# Patient Record
Sex: MALE | Race: WHITE | Employment: OTHER | URBAN - NONMETROPOLITAN AREA
[De-identification: names, ages, dates, MRNs, and addresses within clinical notes are randomized per-mention and may not be internally consistent; named-entity substitution may affect disease eponyms.]

---

## 2017-08-31 ENCOUNTER — APPOINTMENT (OUTPATIENT)
Dept: CT IMAGING | Age: 47
End: 2017-08-31
Payer: COMMERCIAL

## 2017-08-31 ENCOUNTER — HOSPITAL ENCOUNTER (EMERGENCY)
Age: 47
Discharge: HOME | End: 2017-08-31
Attending: FAMILY MEDICINE
Payer: COMMERCIAL

## 2017-08-31 VITALS
HEIGHT: 71 IN | WEIGHT: 185 LBS | HEART RATE: 52 BPM | OXYGEN SATURATION: 95 % | DIASTOLIC BLOOD PRESSURE: 100 MMHG | BODY MASS INDEX: 25.9 KG/M2 | RESPIRATION RATE: 22 BRPM | SYSTOLIC BLOOD PRESSURE: 140 MMHG

## 2017-08-31 DIAGNOSIS — S01.01XA SCALP LACERATION, INITIAL ENCOUNTER: Primary | ICD-10-CM

## 2017-08-31 PROCEDURE — 2500000003 HC RX 250 WO HCPCS: Performed by: FAMILY MEDICINE

## 2017-08-31 PROCEDURE — 12002 RPR S/N/AX/GEN/TRNK2.6-7.5CM: CPT

## 2017-08-31 PROCEDURE — 72125 CT NECK SPINE W/O DYE: CPT

## 2017-08-31 PROCEDURE — 70450 CT HEAD/BRAIN W/O DYE: CPT

## 2017-08-31 PROCEDURE — 99283 EMERGENCY DEPT VISIT LOW MDM: CPT

## 2017-08-31 RX ORDER — LIDOCAINE HYDROCHLORIDE AND EPINEPHRINE 10; 10 MG/ML; UG/ML
20 INJECTION, SOLUTION INFILTRATION; PERINEURAL ONCE
Status: COMPLETED | OUTPATIENT
Start: 2017-08-31 | End: 2017-08-31

## 2017-08-31 RX ADMIN — LIDOCAINE HYDROCHLORIDE,EPINEPHRINE BITARTRATE 20 ML: 10; .01 INJECTION, SOLUTION INFILTRATION; PERINEURAL at 22:30

## 2017-08-31 ASSESSMENT — ENCOUNTER SYMPTOMS
BACK PAIN: 0
RHINORRHEA: 0
VOMITING: 0
SORE THROAT: 0
SHORTNESS OF BREATH: 0
WHEEZING: 0
ABDOMINAL PAIN: 0
EYE REDNESS: 0
COUGH: 0
EYE DISCHARGE: 0
NAUSEA: 0
DIARRHEA: 0
EYE PAIN: 0

## 2017-08-31 ASSESSMENT — PAIN SCALES - GENERAL: PAINLEVEL_OUTOF10: 0

## 2024-06-18 ENCOUNTER — HOSPITAL ENCOUNTER (EMERGENCY)
Age: 54
Discharge: HOME | End: 2024-06-18
Attending: FAMILY MEDICINE
Payer: COMMERCIAL

## 2024-06-18 VITALS
HEIGHT: 71 IN | OXYGEN SATURATION: 95 % | SYSTOLIC BLOOD PRESSURE: 133 MMHG | TEMPERATURE: 97.8 F | BODY MASS INDEX: 26.6 KG/M2 | WEIGHT: 190 LBS | RESPIRATION RATE: 16 BRPM | HEART RATE: 70 BPM | DIASTOLIC BLOOD PRESSURE: 99 MMHG

## 2024-06-18 DIAGNOSIS — S01.01XA LACERATION OF SCALP, INITIAL ENCOUNTER: Primary | ICD-10-CM

## 2024-06-18 PROCEDURE — 99284 EMERGENCY DEPT VISIT MOD MDM: CPT

## 2024-06-18 PROCEDURE — 12001 RPR S/N/AX/GEN/TRNK 2.5CM/<: CPT

## 2024-06-18 PROCEDURE — 6360000002 HC RX W HCPCS: Performed by: FAMILY MEDICINE

## 2024-06-18 PROCEDURE — 90715 TDAP VACCINE 7 YRS/> IM: CPT | Performed by: FAMILY MEDICINE

## 2024-06-18 PROCEDURE — 90471 IMMUNIZATION ADMIN: CPT | Performed by: FAMILY MEDICINE

## 2024-06-18 RX ADMIN — TETANUS TOXOID, REDUCED DIPHTHERIA TOXOID AND ACELLULAR PERTUSSIS VACCINE, ADSORBED 0.5 ML: 5; 2.5; 8; 8; 2.5 SUSPENSION INTRAMUSCULAR at 11:15

## 2024-06-18 ASSESSMENT — PAIN - FUNCTIONAL ASSESSMENT: PAIN_FUNCTIONAL_ASSESSMENT: NONE - DENIES PAIN

## 2024-06-18 NOTE — DISCHARGE INSTR - COC
Continuity of Care Form    Patient Name: Leonel Ortiz   :  1970  MRN:  [id number]    Admit date:  2024  Discharge date:  ***    Code Status Order: No Order   Advance Directives:     Admitting Physician:  No admitting provider for patient encounter.  PCP: Bryan Pastor MD    Discharging Nurse: ***  Discharging Hospital Unit/Room#: 2TR/TR2  Discharging Unit Phone Number: ***    Emergency Contact:   Extended Emergency Contact Information  Primary Emergency Contact: Diana   [facility]  Home Phone: [phone]  Relation: Spouse    Past Surgical History:  History reviewed. No pertinent surgical history.    Immunization History:   Immunization History   Administered Date(s) Administered    TDaP, ADACEL (age 10y-64y), BOOSTRIX (age 10y+), IM, 0.5mL 2016, 2024       Active Problems:  There is no problem list on file for this patient.      Isolation/Infection:   Isolation            No Isolation          Patient Infection Status       None to display            Nurse Assessment:  Last Vital Signs: BP (!) 133/99   Pulse 70   Temp 97.8 °F (36.6 °C) (Temporal)   Resp 16   Ht 1.803 m (5' 11\")   Wt 86.2 kg (190 lb)   SpO2 95%   BMI 26.50 kg/m²     Last documented pain score (0-10 scale):    Last Weight:   Wt Readings from Last 1 Encounters:   24 86.2 kg (190 lb)     Mental Status:  {IP PT MENTAL STATUS:}    IV Access:  { DOUGLAS IV ACCESS:816692710}    Nursing Mobility/ADLs:  Walking   {CHP DME ADLs:290384214}  Transfer  {CHP DME ADLs:315011035}  Bathing  {CHP DME ADLs:051188357}  Dressing  {CHP DME ADLs:014237452}  Toileting  {CHP DME ADLs:881556782}  Feeding  {CHP DME ADLs:862609505}  Med Admin  {CHP DME ADLs:231539932}  Med Delivery   { DOUGLAS MED Delivery:048237533}    Wound Care Documentation and Therapy:        Elimination:  Continence:   Bowel: {YES / NO:}  Bladder: {YES / NO:88975}  Urinary Catheter: {Urinary Catheter:756128338}  Diana  is necessary for the continuing treatment of the diagnosis listed and that he requires {Admit to Appropriate Level of Care:05658} for {GREATER/LESS:438034888} 30 days.     Update Admission H&P: {CHP DME Changes in HandP:560695333}    PHYSICIAN SIGNATURE:  {Esignature:514612721}

## 2024-06-18 NOTE — DISCHARGE INSTRUCTIONS
Leonel Ortiz,    It has been my absolute pleasure to serve you while in the emergency department at [facility] today.  Please do remember to take all medications as prescribed.  Please make sure you follow-up with your PCP within 7 days.  Please follow-up with any and all specialists as we discussed.  Please return to the ER in case of any worsening of symptoms.  I wish you a speedy recovery!    Sincerely,    Dr. Tremaine Baldwin MD.

## 2024-06-18 NOTE — ED PROVIDER NOTES
[facility]  eMERGENCY dEPARTMENT eNCOUnter          CHIEF COMPLAINT       Chief Complaint   Patient presents with    Laceration       Nurses Notes reviewed and I agree except as noted in the HPI.    HISTORY OF PRESENT ILLNESS    Leonel Ortiz is a 54 y.o. male who presents to the Emergency Department for the evaluation of head injury.  Patient says that he hit his head against some form equipment earlier today.  He says there was not any significant bleeding.  He denies any nausea or vomiting.  He cannot recall when his last tetanus shot was but he thinks it was at least 5 to 6 years ago.  Has when he denies any vision changes.  He works in a perlita and dirty environment so he was concerned that the open wound would be exposed to those elements.      The HPI was provided by the patient.     REVIEW OF SYSTEMS       Eyes: no vision changes  Ears, Nose, Mouth, Throat: no hearing disturbance, no nasal swelling, no epistaxis, no difficulty swallowing  Cardiovascular: no chest pains  Respiratory: no SOB, no cough  Gastrointestinal: no abdominal pain, no nausea, no vomiting, no diarrhea  Genitourinary: no change in urinary frequency, no dysuria symptoms  Musculoskeletal: no joint pains, no muscle pains  Integumentary (skin and/or breast): no rashes, no swelling, no redness  Neurological: no weakness, no facial droop, no confusion  Psychiatric: no depression, no anxiety    MEDICAL HISTORY    has no past medical history on file.    SURGICAL HISTORY      has no past surgical history on file.    CURRENT MEDICATIONS       There are no discharge medications for this patient.      ALLERGIES     has No Known Allergies.    FAMILY HISTORY     has no family status information on file.    family history is not on file.    SOCIAL HISTORY      reports that he has never smoked. He has never used smokeless tobacco. He reports that he does not drink alcohol.    PHYSICAL EXAM     INITIAL VITALS:  height is 1.803 m (5' 11\")  and weight is 86.2 kg (190 lb). His temporal temperature is 97.8 °F (36.6 °C). His blood pressure is 133/99 (abnormal) and his pulse is 70. His respiration is 16 and oxygen saturation is 95%.      Constitutional: well nourished, well developed.  Eyes: PERRLA, no drainage, no conjunctival injection, symmetrical lids.  ENMT: atraumatic external nose and ears, moist mucous membranes. normal TM bilaterally, no nasal swelling, drainage, epistaxis, patent oropharynx.  Neck: symmetric, trachea midline, no thyromegaly.  Cardiovascular: normal heart rate, normal rhythm, no murmurs/rubs, normal pulses in all extremities.  Respiratory: normal breath sounds, no rales/rhonchi/wheezes, clear to auscultation bilaterally.   Gastrointestinal: no abdominal tenderness, Non-distended abdomen. Normal bowel sounds. No hepatosplenomegaly.   Genitourinary: normal testicles, normal penis, no drainage, no bleeding.  Musculoskeletal: no joint tenderness, no effusions, normal strength, normal pulses  Integumentary (skin and/or breast): Superficial laceration noted to the scalp.  Approximately 1 cm in length.  Linear.  No active bleeding.  Edges are well-approximated  Neurological: CN II-XI intact, normal  strength bilaterally, normal sensation, motor function. AOx4.   Psychiatric: normal affect, normal mood.     DIFFERENTIAL DIAGNOSIS:   Closed head injury  Concussion with LOC  Concussion without LOC  Subdural hemorrhage  Subarachnoid hemorrhage    DIAGNOSTIC RESULTS     EKG: All EKG's are interpreted by the Emergency Department Physician who either signs or Co-signs this chart in the absence of a cardiologist.  EKG interpreted by Tremaine Baldwin MD:        RADIOLOGY: non-plain film images(s) such as CT, Ultrasound and MRI are read by the radiologist.    No orders to display       LABS:   Labs Reviewed - No data to display    EMERGENCY DEPARTMENT COURSE & MDM:   Vitals:    Vitals:    06/18/24 1046 06/18/24 1130   BP: (!) 140/97 (!)

## 2024-06-18 NOTE — ED NOTES
Pt. Presents ambulatory to ED with c/o laceration top of head.  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 Denies loc, no vomiting.    oral

## 2025-07-20 ENCOUNTER — HOSPITAL ENCOUNTER (OUTPATIENT)
Age: 55
Discharge: HOME | End: 2025-07-20
Payer: COMMERCIAL

## 2025-07-20 ENCOUNTER — HOSPITAL ENCOUNTER (OUTPATIENT)
Dept: GENERAL RADIOLOGY | Age: 55
Discharge: HOME | End: 2025-07-20
Payer: COMMERCIAL

## 2025-07-20 DIAGNOSIS — M25.551 RIGHT HIP PAIN: ICD-10-CM

## 2025-07-20 PROCEDURE — 73502 X-RAY EXAM HIP UNI 2-3 VIEWS: CPT
